# Patient Record
Sex: FEMALE | HISPANIC OR LATINO | Employment: FULL TIME | URBAN - METROPOLITAN AREA
[De-identification: names, ages, dates, MRNs, and addresses within clinical notes are randomized per-mention and may not be internally consistent; named-entity substitution may affect disease eponyms.]

---

## 2018-05-28 ENCOUNTER — HOSPITAL ENCOUNTER (EMERGENCY)
Facility: MEDICAL CENTER | Age: 48
End: 2018-05-28
Attending: EMERGENCY MEDICINE
Payer: COMMERCIAL

## 2018-05-28 ENCOUNTER — NON-PROVIDER VISIT (OUTPATIENT)
Dept: OCCUPATIONAL MEDICINE | Facility: CLINIC | Age: 48
End: 2018-05-28
Payer: COMMERCIAL

## 2018-05-28 VITALS
WEIGHT: 142.42 LBS | HEIGHT: 67 IN | HEART RATE: 69 BPM | BODY MASS INDEX: 22.35 KG/M2 | OXYGEN SATURATION: 99 % | DIASTOLIC BLOOD PRESSURE: 63 MMHG | RESPIRATION RATE: 16 BRPM | SYSTOLIC BLOOD PRESSURE: 102 MMHG | TEMPERATURE: 98.2 F

## 2018-05-28 DIAGNOSIS — Z02.83 ENCOUNTER FOR DRUG SCREENING: ICD-10-CM

## 2018-05-28 DIAGNOSIS — Z02.1 PRE-EMPLOYMENT DRUG SCREENING: ICD-10-CM

## 2018-05-28 DIAGNOSIS — M54.42 ACUTE BILATERAL LOW BACK PAIN WITH LEFT-SIDED SCIATICA: ICD-10-CM

## 2018-05-28 PROCEDURE — 80305 DRUG TEST PRSMV DIR OPT OBS: CPT | Performed by: INTERNAL MEDICINE

## 2018-05-28 PROCEDURE — 82075 ASSAY OF BREATH ETHANOL: CPT | Performed by: INTERNAL MEDICINE

## 2018-05-28 PROCEDURE — 8895 PB URINE 6 PANEL AFTER HOURS: Performed by: INTERNAL MEDICINE

## 2018-05-28 PROCEDURE — 99283 EMERGENCY DEPT VISIT LOW MDM: CPT

## 2018-05-28 RX ORDER — IBUPROFEN 800 MG/1
800 TABLET ORAL EVERY 8 HOURS PRN
Qty: 20 TAB | Refills: 0 | Status: SHIPPED | OUTPATIENT
Start: 2018-05-28

## 2018-05-28 RX ORDER — METHYLPREDNISOLONE 4 MG/1
TABLET ORAL
Qty: 1 KIT | Refills: 0 | Status: SHIPPED | OUTPATIENT
Start: 2018-05-28

## 2018-05-28 ASSESSMENT — PAIN SCALES - GENERAL: PAINLEVEL_OUTOF10: 5

## 2018-05-28 NOTE — ED NOTES
Pt bib self with c/o lower back pain that radiates down her legs. Pt states she's a  and while she was making a bed she developed back pain.

## 2018-05-28 NOTE — LETTER
"  FORM C-4:  EMPLOYEE’S CLAIM FOR COMPENSATION/ REPORT OF INITIAL TREATMENT  EMPLOYEE’S CLAIM - PROVIDE ALL INFORMATION REQUESTED   First Name  Reba Last Name  Dylan Birthdate             Age  1970 48 y.o. 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 (if applicable)  cleaning    How did this injury or occupational disease occur? Be specific and answer in detail. Use additional sheet if necessary)  I was in front of the bed, and I feel bad pain in my lower back I don't know exacly wath happen.   If you believe that you have an occupational disease, when did you first have knowledge of the disability and it relationship to your employment?  N/A Witnesses to the Accident  none     Nature of Injury or Occupational Disease  Strain  Part(s) of Body Injured or Affected  Lower Back Area (Lumbar Area & Lumbo-Sacral), N/A, N/A    I certify that the above is true and correct to the best of my knowledge and that I have provided this information in order to obtain " the benefits of [place]  I hereby authorize any physician, chiropractor, surgeon, practitioner, or other person, any hospital, including [facility] or [facility] hospital, any medical service organization, any insurance company, or other institution or organization to release to each other, any medical or other information, including benefits paid or payable, pertinent to this injury or disease, except information relative to diagnosis, treatment and/or counseling for AIDS, psychological conditions, alcohol or controlled substances, for which I must give specific authorization.  A Photostat of this authorization shall be as valid as the original.   Date  05/28/2018 [facility] [place]   Employee’s Signature   THIS REPORT MUST BE COMPLETED AND MAILED WITHIN 3 WORKING DAYS OF TREATMENT   Place  [facility], EMERGENCY DEPT  Name of Facility   [facility]   Date  5/28/2018 Diagnosis  (M54.42) Acute bilateral low back pain with left-sided sciatica Is there evidence the injured employee was under the influence of alcohol and/or another controlled substance at the time of accident?   Hour  2:40 PM Description of Injury or Disease  Acute bilateral low back pain with left-sided sciatica No   Treatment  1.  Medrol Dosepak  2.  Motrin 800 mg  Have you advised the patient to remain off work five days or more?         No   X-Ray Findings      If Yes   From Date    To Date      From information given by the employee, together with medical evidence, can you directly connect this injury or occupational disease as job incurred?  Yes If No, is the employee capable of: Full Duty  No Modified Duty  Yes   Is additional medical care by a physician indicated?  Yes If Modified Duty, Specify any Limitations / Restrictions  Light duty ×3 days     Do you know of any  "previous injury or disease contributing to this condition or occupational disease?  No   Date  5/28/2018 Print Doctor’s Name  Gansert, Guy G I certify the employer’s copy of this form was mailed on:   Address  [address] [place] [address] [phone] Insurer’s Use Only   [facility]  [address]    Provider’s Tax ID Number  [id number] Telephone  Dept: [phone]    Doctor’s Signature  e-SignGANSERT, GUY G M.D. Degree  M.D.    Original - TREATING PHYSICIAN OR CHIROPRACTOR   Pg 2-Insurer/TPA   Pg 3-Employer   Pg 4-Employee                                                                                                  Form C-4 (rev01/03)     BRIEF DESCRIPTION OF RIGHTS AND BENEFITS  (Pursuant to [place] 616C.050)    Notice of Injury or Occupational Disease (Incident Report Form C-1): If an injury or occupational disease (OD) arises out of and in the course of employment, you must provide written notice to your employer as soon as practicable, but no later than 7 days after the accident or OD. Your employer shall maintain a sufficient supply of the required forms.    Claim for Compensation (Form C-4): If medical treatment is sought, the form C-4 is available at the place of initial treatment. A completed \"Claim for Compensation\" (Form C-4) must be filed within 90 days after an accident or OD. The treating physician or chiropractor must, within 3 working days after treatment, complete and mail to the employer, the employer's insurer and third-party , the Claim for Compensation.    Medical Treatment: If you require medical treatment for your on-the-job injury or OD, you may be required to select a physician or chiropractor from a list provided by your workers’ compensation insurer, if it has contracted with an Organization for Managed Care (MCO) or Preferred Provider Organization (PPO) or providers of health care. If your employer has not entered into a contract with an MCO or PPO, " you may select a physician or chiropractor from the Panel of Physicians and Chiropractors. Any medical costs related to your industrial injury or OD will be paid by your insurer.    Temporary Total Disability (TTD): If your doctor has certified that you are unable to work for a period of at least 5 consecutive days, or 5 cumulative days in a 20-day period, or places restrictions on you that your employer does not accommodate, you may be entitled to TTD compensation.    Temporary Partial Disability (TPD): If the wage you receive upon reemployment is less than the compensation for TTD to which you are entitled, the insurer may be required to pay you TPD compensation to make up the difference. TPD can only be paid for a maximum of 24 months.    Permanent Partial Disability (PPD): When your medical condition is stable and there is an indication of a PPD as a result of your injury or OD, within 30 days, your insurer must arrange for an evaluation by a rating physician or chiropractor to determine the degree of your PPD. The amount of your PPD award depends on the date of injury, the results of the PPD evaluation and your age and wage.    Permanent Total Disability (PTD): If you are medically certified by a treating physician or chiropractor as permanently and totally disabled and have been granted a PTD status by your insurer, you are entitled to receive monthly benefits not to exceed 66 2/3% of your average monthly wage. The amount of your PTD payments is subject to reduction if you previously received a PPD award.    Vocational Rehabilitation Services: You may be eligible for vocational rehabilitation services if you are unable to return to the job due to a permanent physical impairment or permanent restrictions as a result of your injury or occupational disease.    Transportation and Per Amy Reimbursement: You may be eligible for travel expenses and per amy associated with medical treatment.  Reopening: You may be  able to reopen your claim if your condition worsens after claim closure.    Appeal Process: If you disagree with a written determination issued by the insurer or the insurer does not respond to your request, you may appeal to the Department of Administration, , by following the instructions contained in your determination letter. You must appeal the determination within 70 days from the date of the determination letter at [address], [address], [place], [place] [address], or [address], [place], [place] [address]. If you disagree with the  decision, you may appeal to the Department of Administration, . You must file your appeal within 30 days from the date of the  decision letter at [address], [address], [place], [place] [address], or [address], [place], [place] [address]. If you disagree with a decision of an , you may file a petition for judicial review with the District Court. You must do so within 30 days of the Appeal Officer’s decision. You may be represented by an  at your own expense or you may contact the [facility] for possible representation.    [place]  for Injured Workers (NAIW): If you disagree with a  decision, you may request that NAIW represent you without charge at an  Hearing. For information regarding denial of benefits, you may contact the [facility] at: [address], [address], [place] [address], [phone], or [address], [place] [address], [phone]    To File a Complaint with the Division: If you wish to file a complaint with the  of the Division of Industrial Relations (DIR), please contact the Workers’ Compensation Section, [address], [address], [place], [place] [address], telephone [phone], or [address], [address], [place]  [place] [address], telephone [phone].    For assistance with Workers’ Compensation Issues: you may contact the Office of the Governor Consumer Health Assistance, [address] [place] [address], [address], [place], [place] [address], Toll Free 1-833.156.8762, Web site: [email or web address]., E-mail [email or web address].                                                                                                                                                                                             ________________________________________________________________                                              05/28/2018            Employee Name / Signature                                                                                                                            Date                                       D-2 (rev. 10/07)

## 2018-05-28 NOTE — ED NOTES
Discharge instructions provided. Rx x2 given and pt educated on how and when to take medications, pt educated to follow up with works man comp for return to work. Pt verbalized the understanding of discharge instructions to follow up with PCP and to return to ER if condition worsens.  Pt ambulated out of ER without difficulty.

## 2018-05-28 NOTE — ED PROVIDER NOTES
"ED Provider Note    CHIEF COMPLAINT  Chief Complaint   Patient presents with   • Back Pain       HPI  Reba Richardson is a 48 y.o. female who presents for evaluation of back pain.  The patient was working today at the [employer], where she works as a .  The patient describes a twisting motion then states she develop lower back pain.  Chest some pain into her left lower extremity with some tingling in her left lateral leg area.  The patient denies any direct trauma or any recent falls.  She denies any previous back problems.  There's been no recent: Fever, chills, URI symptoms, cardiac respiratory symptoms, gastrointestinal symptoms, genitourinary symptoms.  She denies any possibility of pregnancy.  No other acute symptomatology or complaints.    REVIEW OF SYSTEMS  See HPI for further details.  Patient denies a history of: Hypertension, diabetes, thyroid dysfunction, seizures, cardiac pulmonary disorders, gastrointestinal disorders.  All other systems negative.    PAST MEDICAL HISTORY  History reviewed. No pertinent past medical history.    FAMILY HISTORY  History reviewed. No pertinent family history.    SOCIAL HISTORY  This occurred while working;    SURGICAL HISTORY  History reviewed. No pertinent surgical history.    CURRENT MEDICATIONS  None    ALLERGIES  Allergies not on file    PHYSICAL EXAM  VITAL SIGNS: /70   Pulse 68   Temp 36.8 °C (98.2 °F)   Resp 18   Ht 1.702 m (5' 7\")   Wt 64.6 kg (142 lb 6.7 oz)   SpO2 100%   BMI 22.31 kg/m²    Constitutional: A 48-year-old female, Well developed, Well nourished, is mildly uncomfortable with movement but oriented ×3  HENT: Normocephalic, Atraumatic, Nares:Clear, Oropharynx: moist, well hydrated, posterior pharynx:clear   Eyes: PERRL, EOMI, Conjunctiva normal, No discharge.   Neck: Normal range of motion, No tenderness, Supple, No stridor.   Lymphatic: No lymphadenopathy noted.   Cardiovascular: Regular rate and rhythm without mumurs, " gallups, rubs   Thorax & Lungs: Normal Equal breath sounds, No respiratory distress, No wheezing, no stridor, no rales. No chest tenderness.   Abdomen: Soft, nontender, nondistended, no organomegaly, positive bowel sounds normal in quality. No guarding or rebound.  Skin: Good skin turgor, pink, warm, dry. No rashes, petechiae, purpura. Normal capillary refill.   Back: Tenderness in the bilateral paraspinous musculature of the mid to lower lumbar spine region, No CVA tenderness.   Extremities: Intact distal pulses, No edema, No tenderness, No cyanosis,  Vascular: Pulses are 2+, symmetric in the upper and lower extremities.  Musculoskeletal: Good range of motion in all major joints. No tenderness to palpation or major deformities noted.   Neurologic: Alert & oriented x 3,  No gross focal deficits noted.  With 5/5; DTRs 2+ and symmetric; mildly positive straight leg raise at 30° on the left;   Psychiatric: Affect normal, Judgment normal, Mood normal.      COURSE & MEDICAL DECISION MAKING  Pertinent Labs & Imaging studies reviewed. (See chart for details)  Discussion: At this time, the patient has back pain with some radicular symptomatology.  There is no evidence of acute cord syndrome, discitis, arachnoiditis, meningitis or other infections.  There is no history of any significant trauma that would warrant imaging studies at this time.  Based on the findings, I do not feel we need to proceed with a laboratory or imaging studies.  The discussed the findings and treatment plan with the patient.  She indicates she is comfortable with this explanation and disposition.    FINAL IMPRESSION  1. Acute bilateral low back pain with left-sided sciatica        PLAN  1.  Appropriate discharge instructions given  2.  Medrol Dosepak  3.  Motrin 800 mg #20  4.  Follow-up [facility] [place] [facility] occupational [facility] tomorrow    Electronically signed by: Guy G Gansert, 5/28/2018 1:54 PM

## 2018-05-28 NOTE — DISCHARGE INSTRUCTIONS
Back Pain, Adult  Back pain is very common in adults. The cause of back pain is rarely dangerous and the pain often gets better over time. The cause of your back pain may not be known. Some common causes of back pain include:  · Strain of the muscles or ligaments supporting the spine.  · Wear and tear (degeneration) of the spinal disks.  · Arthritis.  · Direct injury to the back.  For many people, back pain may return. Since back pain is rarely dangerous, most people can learn to manage this condition on their own.  Follow these instructions at home:  Watch your back pain for any changes. The following actions may help to lessen any discomfort you are feeling:  · Remain active. It is stressful on your back to sit or  one place for long periods of time. Do not sit, drive, or  one place for more than 30 minutes at a time. Take short walks on even surfaces as soon as you are able. Try to increase the length of time you walk each day.  · Exercise regularly as directed by your health care provider. Exercise helps your back heal faster. It also helps avoid future injury by keeping your muscles strong and flexible.  · Do not stay in bed. Resting more than 1-2 days can delay your recovery.  · Pay attention to your body when you bend and lift. The most comfortable positions are those that put less stress on your recovering back. Always use proper lifting techniques, including:  ¨ Bending your knees.  ¨ Keeping the load close to your body.  ¨ Avoiding twisting.  · Find a comfortable position to sleep. Use a firm mattress and lie on your side with your knees slightly bent. If you lie on your back, put a pillow under your knees.  · Avoid feeling anxious or stressed. Stress increases muscle tension and can worsen back pain. It is important to recognize when you are anxious or stressed and learn ways to manage it, such as with exercise.  · Take medicines only as directed by your health care provider.  Over-the-counter medicines to reduce pain and inflammation are often the most helpful. Your health care provider may prescribe muscle relaxant drugs. These medicines help dull your pain so you can more quickly return to your normal activities and healthy exercise.  · Apply ice to the injured area:  ¨ Put ice in a plastic bag.  ¨ Place a towel between your skin and the bag.  ¨ Leave the ice on for 20 minutes, 2-3 times a day for the first 2-3 days. After that, ice and heat may be alternated to reduce pain and spasms.  · Maintain a healthy weight. Excess weight puts extra stress on your back and makes it difficult to maintain good posture.  Contact a health care provider if:  · You have pain that is not relieved with rest or medicine.  · You have increasing pain going down into the legs or buttocks.  · You have pain that does not improve in one week.  · You have night pain.  · You lose weight.  · You have a fever or chills.  Get help right away if:  · You develop new bowel or bladder control problems.  · You have unusual weakness or numbness in your arms or legs.  · You develop nausea or vomiting.  · You develop abdominal pain.  · You feel faint.  This information is not intended to replace advice given to you by your health care provider. Make sure you discuss any questions you have with your health care provider.  Document Released: 12/18/2006 Document Revised: 04/27/2017 Document Reviewed: 04/21/2015  [employer] Interactive Patient Education © 2017 [employer]

## 2018-05-29 LAB
AMP AMPHETAMINE: NORMAL
BREATH ALCOHOL COMMENT: NORMAL
COC COCAINE: NORMAL
INT CON NEG: NEGATIVE
INT CON POS: POSITIVE
MET METHAMPHETAMINES: NORMAL
OPI OPIATES: NORMAL
PCP PHENCYCLIDINE: NORMAL
POC BREATHALIZER: 0 PERCENT (ref 0–0.01)
POC DRUG COMMENT 753798-OCCUPATIONAL HEALTH: NORMAL
THC: NORMAL

## 2018-07-11 ENCOUNTER — TELEPHONE (OUTPATIENT)
Dept: VASCULAR LAB | Facility: MEDICAL CENTER | Age: 48
End: 2018-07-11

## 2018-07-11 NOTE — TELEPHONE ENCOUNTER
Called patient to schedule initial vasscular appointment with Dr. Cheng. LM instructing patient to call back when available to schedule.    Robert Cardenas, Med. [facility]

## 2018-12-19 ENCOUNTER — TELEPHONE (OUTPATIENT)
Dept: VASCULAR LAB | Facility: MEDICAL CENTER | Age: 48
End: 2018-12-19

## 2018-12-20 NOTE — TELEPHONE ENCOUNTER
Patient no-showed for initial vascular medicine referral and did not r/s.  Pending further patient contact, will defer all further vascular care to pcp.    Michael Bloch, MD  Vascular Care    Cc: ANDRES Whitman

## 2024-08-23 ENCOUNTER — HOSPITAL ENCOUNTER (OUTPATIENT)
Dept: LAB | Facility: MEDICAL CENTER | Age: 54
End: 2024-08-23
Attending: STUDENT IN AN ORGANIZED HEALTH CARE EDUCATION/TRAINING PROGRAM
Payer: COMMERCIAL

## 2024-08-23 LAB
25(OH)D3 SERPL-MCNC: 27 NG/ML (ref 30–100)
ALBUMIN SERPL BCP-MCNC: 4.5 G/DL (ref 3.2–4.9)
ALBUMIN/GLOB SERPL: 1.7 G/DL
ALP SERPL-CCNC: 80 U/L (ref 30–99)
ALT SERPL-CCNC: 20 U/L (ref 2–50)
ANION GAP SERPL CALC-SCNC: 10 MMOL/L (ref 7–16)
AST SERPL-CCNC: 23 U/L (ref 12–45)
BASOPHILS # BLD AUTO: 1.1 % (ref 0–1.8)
BASOPHILS # BLD: 0.04 K/UL (ref 0–0.12)
BILIRUB SERPL-MCNC: 0.6 MG/DL (ref 0.1–1.5)
BUN SERPL-MCNC: 13 MG/DL (ref 8–22)
CALCIUM ALBUM COR SERPL-MCNC: 9.3 MG/DL (ref 8.5–10.5)
CALCIUM SERPL-MCNC: 9.7 MG/DL (ref 8.5–10.5)
CHLORIDE SERPL-SCNC: 104 MMOL/L (ref 96–112)
CHOLEST SERPL-MCNC: 240 MG/DL (ref 100–199)
CO2 SERPL-SCNC: 25 MMOL/L (ref 20–33)
CREAT SERPL-MCNC: 0.68 MG/DL (ref 0.5–1.4)
EOSINOPHIL # BLD AUTO: 0.11 K/UL (ref 0–0.51)
EOSINOPHIL NFR BLD: 2.9 % (ref 0–6.9)
ERYTHROCYTE [DISTWIDTH] IN BLOOD BY AUTOMATED COUNT: 41.2 FL (ref 35.9–50)
EST. AVERAGE GLUCOSE BLD GHB EST-MCNC: 97 MG/DL
FASTING STATUS PATIENT QL REPORTED: NORMAL
GFR SERPLBLD CREATININE-BSD FMLA CKD-EPI: 103 ML/MIN/1.73 M 2
GLOBULIN SER CALC-MCNC: 2.7 G/DL (ref 1.9–3.5)
GLUCOSE SERPL-MCNC: 99 MG/DL (ref 65–99)
HBA1C MFR BLD: 5 % (ref 4–5.6)
HCT VFR BLD AUTO: 43.4 % (ref 37–47)
HDLC SERPL-MCNC: 71 MG/DL
HGB BLD-MCNC: 14.4 G/DL (ref 12–16)
IMM GRANULOCYTES # BLD AUTO: 0 K/UL (ref 0–0.11)
IMM GRANULOCYTES NFR BLD AUTO: 0 % (ref 0–0.9)
LDLC SERPL CALC-MCNC: 158 MG/DL
LYMPHOCYTES # BLD AUTO: 1.59 K/UL (ref 1–4.8)
LYMPHOCYTES NFR BLD: 42 % (ref 22–41)
MCH RBC QN AUTO: 32.3 PG (ref 27–33)
MCHC RBC AUTO-ENTMCNC: 33.2 G/DL (ref 32.2–35.5)
MCV RBC AUTO: 97.3 FL (ref 81.4–97.8)
MONOCYTES # BLD AUTO: 0.28 K/UL (ref 0–0.85)
MONOCYTES NFR BLD AUTO: 7.4 % (ref 0–13.4)
NEUTROPHILS # BLD AUTO: 1.77 K/UL (ref 1.82–7.42)
NEUTROPHILS NFR BLD: 46.6 % (ref 44–72)
NRBC # BLD AUTO: 0 K/UL
NRBC BLD-RTO: 0 /100 WBC (ref 0–0.2)
PLATELET # BLD AUTO: 209 K/UL (ref 164–446)
PMV BLD AUTO: 9.9 FL (ref 9–12.9)
POTASSIUM SERPL-SCNC: 4.5 MMOL/L (ref 3.6–5.5)
PROT SERPL-MCNC: 7.2 G/DL (ref 6–8.2)
RBC # BLD AUTO: 4.46 M/UL (ref 4.2–5.4)
SODIUM SERPL-SCNC: 139 MMOL/L (ref 135–145)
TRIGL SERPL-MCNC: 57 MG/DL (ref 0–149)
TSH SERPL DL<=0.005 MIU/L-ACNC: 0.68 UIU/ML (ref 0.38–5.33)
WBC # BLD AUTO: 3.8 K/UL (ref 4.8–10.8)

## 2024-08-23 PROCEDURE — 80053 COMPREHEN METABOLIC PANEL: CPT

## 2024-08-23 PROCEDURE — 83036 HEMOGLOBIN GLYCOSYLATED A1C: CPT

## 2024-08-23 PROCEDURE — 80061 LIPID PANEL: CPT

## 2024-08-23 PROCEDURE — 85025 COMPLETE CBC W/AUTO DIFF WBC: CPT

## 2024-08-23 PROCEDURE — 36415 COLL VENOUS BLD VENIPUNCTURE: CPT

## 2024-08-23 PROCEDURE — 82306 VITAMIN D 25 HYDROXY: CPT

## 2024-08-23 PROCEDURE — 84443 ASSAY THYROID STIM HORMONE: CPT

## 2024-09-14 ENCOUNTER — APPOINTMENT (OUTPATIENT)
Dept: RADIOLOGY | Facility: MEDICAL CENTER | Age: 54
End: 2024-09-14
Attending: EMERGENCY MEDICINE
Payer: COMMERCIAL

## 2024-09-14 ENCOUNTER — HOSPITAL ENCOUNTER (EMERGENCY)
Facility: MEDICAL CENTER | Age: 54
End: 2024-09-14
Attending: EMERGENCY MEDICINE
Payer: COMMERCIAL

## 2024-09-14 VITALS
BODY MASS INDEX: 23.38 KG/M2 | TEMPERATURE: 96.8 F | SYSTOLIC BLOOD PRESSURE: 122 MMHG | OXYGEN SATURATION: 97 % | HEART RATE: 58 BPM | HEIGHT: 66 IN | DIASTOLIC BLOOD PRESSURE: 72 MMHG | WEIGHT: 145.5 LBS | RESPIRATION RATE: 18 BRPM

## 2024-09-14 DIAGNOSIS — R07.89 CHEST WALL PAIN: ICD-10-CM

## 2024-09-14 LAB
ALBUMIN SERPL BCP-MCNC: 4.7 G/DL (ref 3.2–4.9)
ALBUMIN/GLOB SERPL: 1.4 G/DL
ALP SERPL-CCNC: 82 U/L (ref 30–99)
ALT SERPL-CCNC: 16 U/L (ref 2–50)
ANION GAP SERPL CALC-SCNC: 16 MMOL/L (ref 7–16)
AST SERPL-CCNC: 22 U/L (ref 12–45)
BASOPHILS # BLD AUTO: 0.6 % (ref 0–1.8)
BASOPHILS # BLD: 0.03 K/UL (ref 0–0.12)
BILIRUB SERPL-MCNC: 0.3 MG/DL (ref 0.1–1.5)
BUN SERPL-MCNC: 20 MG/DL (ref 8–22)
CALCIUM ALBUM COR SERPL-MCNC: 9.1 MG/DL (ref 8.5–10.5)
CALCIUM SERPL-MCNC: 9.7 MG/DL (ref 8.5–10.5)
CHLORIDE SERPL-SCNC: 106 MMOL/L (ref 96–112)
CO2 SERPL-SCNC: 19 MMOL/L (ref 20–33)
CREAT SERPL-MCNC: 0.53 MG/DL (ref 0.5–1.4)
D DIMER PPP IA.FEU-MCNC: 0.57 UG/ML (FEU) (ref 0–0.5)
EKG IMPRESSION: NORMAL
EOSINOPHIL # BLD AUTO: 0.16 K/UL (ref 0–0.51)
EOSINOPHIL NFR BLD: 3.3 % (ref 0–6.9)
ERYTHROCYTE [DISTWIDTH] IN BLOOD BY AUTOMATED COUNT: 41.1 FL (ref 35.9–50)
GFR SERPLBLD CREATININE-BSD FMLA CKD-EPI: 110 ML/MIN/1.73 M 2
GLOBULIN SER CALC-MCNC: 3.3 G/DL (ref 1.9–3.5)
GLUCOSE SERPL-MCNC: 101 MG/DL (ref 65–99)
HCT VFR BLD AUTO: 45.2 % (ref 37–47)
HGB BLD-MCNC: 15.1 G/DL (ref 12–16)
IMM GRANULOCYTES # BLD AUTO: 0.01 K/UL (ref 0–0.11)
IMM GRANULOCYTES NFR BLD AUTO: 0.2 % (ref 0–0.9)
LIPASE SERPL-CCNC: 32 U/L (ref 11–82)
LYMPHOCYTES # BLD AUTO: 1.55 K/UL (ref 1–4.8)
LYMPHOCYTES NFR BLD: 31.7 % (ref 22–41)
MCH RBC QN AUTO: 32.3 PG (ref 27–33)
MCHC RBC AUTO-ENTMCNC: 33.4 G/DL (ref 32.2–35.5)
MCV RBC AUTO: 96.6 FL (ref 81.4–97.8)
MONOCYTES # BLD AUTO: 0.38 K/UL (ref 0–0.85)
MONOCYTES NFR BLD AUTO: 7.8 % (ref 0–13.4)
NEUTROPHILS # BLD AUTO: 2.76 K/UL (ref 1.82–7.42)
NEUTROPHILS NFR BLD: 56.4 % (ref 44–72)
NRBC # BLD AUTO: 0 K/UL
NRBC BLD-RTO: 0 /100 WBC (ref 0–0.2)
PLATELET # BLD AUTO: 204 K/UL (ref 164–446)
PMV BLD AUTO: 9.8 FL (ref 9–12.9)
POTASSIUM SERPL-SCNC: 4.2 MMOL/L (ref 3.6–5.5)
PROT SERPL-MCNC: 8 G/DL (ref 6–8.2)
RBC # BLD AUTO: 4.68 M/UL (ref 4.2–5.4)
SODIUM SERPL-SCNC: 141 MMOL/L (ref 135–145)
TROPONIN T SERPL-MCNC: <6 NG/L (ref 6–19)
WBC # BLD AUTO: 4.9 K/UL (ref 4.8–10.8)

## 2024-09-14 PROCEDURE — 84484 ASSAY OF TROPONIN QUANT: CPT

## 2024-09-14 PROCEDURE — 85025 COMPLETE CBC W/AUTO DIFF WBC: CPT

## 2024-09-14 PROCEDURE — 83690 ASSAY OF LIPASE: CPT

## 2024-09-14 PROCEDURE — 99283 EMERGENCY DEPT VISIT LOW MDM: CPT

## 2024-09-14 PROCEDURE — 80053 COMPREHEN METABOLIC PANEL: CPT

## 2024-09-14 PROCEDURE — 93005 ELECTROCARDIOGRAM TRACING: CPT | Performed by: EMERGENCY MEDICINE

## 2024-09-14 PROCEDURE — 71045 X-RAY EXAM CHEST 1 VIEW: CPT

## 2024-09-14 PROCEDURE — 36415 COLL VENOUS BLD VENIPUNCTURE: CPT

## 2024-09-14 PROCEDURE — 700101 HCHG RX REV CODE 250: Performed by: EMERGENCY MEDICINE

## 2024-09-14 PROCEDURE — 85379 FIBRIN DEGRADATION QUANT: CPT

## 2024-09-14 RX ORDER — LIDOCAINE 4 G/G
1 PATCH TOPICAL EVERY 24 HOURS
Status: DISCONTINUED | OUTPATIENT
Start: 2024-09-14 | End: 2024-09-14 | Stop reason: HOSPADM

## 2024-09-14 RX ADMIN — LIDOCAINE 1 PATCH: 4 PATCH TOPICAL at 03:43

## 2024-09-14 ASSESSMENT — FIBROSIS 4 INDEX: FIB4 SCORE: 1.33

## 2024-09-14 NOTE — ED TRIAGE NOTES
Chief Complaint   Patient presents with    Rib Pain     Pt complaining of left rib pain that has been going on for a few days. Pt denies any trauma to that side of her body. Pt currently complaining of 10/10 pain.     Vitals:    09/14/24 0014   BP: 130/86   Pulse: 62   Resp: 18   Temp: 36 °C (96.8 °F)   SpO2: 98%

## 2024-09-14 NOTE — ED NOTES
Patient given discharge instructions, home care instructions explained, patient verbalized understanding of instructions given/patient understands importance of follow up, patient ambulatory to ER [facility].

## 2024-09-14 NOTE — ED PROVIDER NOTES
ED Provider Note    CHIEF COMPLAINT  Chief Complaint   Patient presents with    Rib Pain     Pt complaining of left rib pain that has been going on for a few days. Pt denies any trauma to that side of her body. Pt currently complaining of 10/10 pain.       EXTERNAL RECORDS REVIEWED  Outpatient Notes outpatient office visit 8/16/2024 at which point she was followed for obesity, chronic low back pain, vitamin D deficiency, malignant neoplasm of breast, hyperlipidemia    HPI/ROS  LIMITATION TO HISTORY   Select: Language Maltese,  Used   OUTSIDE HISTORIAN(S):  Family at bedside for history and translation    Reba Richardson is a 54 y.o. female who presents to the Emergency Department for left-sided chest discomfort.  Works as a local .  Denies any new trauma or injury.  No recent travel.  No leg pain or swelling.  No history of DVT or PE.  No known cardiac or pulmonary pathologies.  Does not take any prescription medications and does not have a local primary care physician.  Does not smoke but socially drinks alcohol.  No illicit substances.    Pain is worse with movement and also deep inspiration.  Has been using Tylenol intermittently over the last couple days which does help the pain.    PAST MEDICAL HISTORY       SURGICAL HISTORY  patient denies any surgical history    FAMILY HISTORY  History reviewed. No pertinent family history.    SOCIAL HISTORY  Social History     Tobacco Use    Smoking status: Never    Smokeless tobacco: Never   Substance and Sexual Activity    Alcohol use: No    Drug use: No    Sexual activity: Not on file       CURRENT MEDICATIONS  Home Medications       Reviewed by Viet Perales R.N. (Registered Nurse) on 09/14/24 at 0022  Med List Status: Not Addressed     Medication Last Dose Status   ibuprofen (MOTRIN) 800 MG Tab  Active   MethylPREDNISolone (MEDROL DOSEPAK) 4 MG Tablet Therapy Pack  Active                  Audit from Redirected Encounters    **Home  "medications have not yet been reviewed for this encounter**         ALLERGIES  No Known Allergies    PHYSICAL EXAM  VITAL SIGNS: /72   Pulse (!) 58   Temp 36 °C (96.8 °F) (Temporal)   Resp 18   Ht 1.676 m (5' 6\")   Wt 66 kg (145 lb 8.1 oz)   SpO2 97%   BMI 23.48 kg/m²      Pulse ox interpretation: I interpret this pulse ox as normal.  Constitutional: Alert in no apparent distress.  HENT: No signs of trauma, Bilateral external ears normal, Nose normal.   Eyes: Pupils are equal and reactive  Neck: Normal range of motion, No tenderness, Supple  Cardiovascular: Regular rate and rhythm, no murmurs.   Thorax & Lungs: Normal breath sounds, No respiratory distress, No wheezing, No chest tenderness.   Abdomen: Bowel sounds normal, Soft, No tenderness  Skin: Warm, Dry, No erythema, No rash.   Musculoskeletal: Good range of motion in all major joints. No tenderness to palpation or major deformities noted.   Neurologic: Alert , Normal motor function, Normal sensory function, No focal deficits noted.   Psychiatric: Affect normal, Judgment normal, Mood normal.         EKG/LABS  Results for orders placed or performed during the hospital encounter of 09/14/24   CBC WITH DIFFERENTIAL   Result Value Ref Range    WBC 4.9 4.8 - 10.8 K/uL    RBC 4.68 4.20 - 5.40 M/uL    Hemoglobin 15.1 12.0 - 16.0 g/dL    Hematocrit 45.2 37.0 - 47.0 %    MCV 96.6 81.4 - 97.8 fL    MCH 32.3 27.0 - 33.0 pg    MCHC 33.4 32.2 - 35.5 g/dL    RDW 41.1 35.9 - 50.0 fL    Platelet Count 204 164 - 446 K/uL    MPV 9.8 9.0 - 12.9 fL    Neutrophils-Polys 56.40 44.00 - 72.00 %    Lymphocytes 31.70 22.00 - 41.00 %    Monocytes 7.80 0.00 - 13.40 %    Eosinophils 3.30 0.00 - 6.90 %    Basophils 0.60 0.00 - 1.80 %    Immature Granulocytes 0.20 0.00 - 0.90 %    Nucleated RBC 0.00 0.00 - 0.20 /100 WBC    Neutrophils (Absolute) 2.76 1.82 - 7.42 K/uL    Lymphs (Absolute) 1.55 1.00 - 4.80 K/uL    Monos (Absolute) 0.38 0.00 - 0.85 K/uL    Eos (Absolute) 0.16 0.00 " - 0.51 K/uL    Baso (Absolute) 0.03 0.00 - 0.12 K/uL    Immature Granulocytes (abs) 0.01 0.00 - 0.11 K/uL    NRBC (Absolute) 0.00 K/uL   COMP METABOLIC PANEL   Result Value Ref Range    Sodium 141 135 - 145 mmol/L    Potassium 4.2 3.6 - 5.5 mmol/L    Chloride 106 96 - 112 mmol/L    Co2 19 (L) 20 - 33 mmol/L    Anion Gap 16.0 7.0 - 16.0    Glucose 101 (H) 65 - 99 mg/dL    Bun 20 8 - 22 mg/dL    Creatinine 0.53 0.50 - 1.40 mg/dL    Calcium 9.7 8.5 - 10.5 mg/dL    Correct Calcium 9.1 8.5 - 10.5 mg/dL    AST(SGOT) 22 12 - 45 U/L    ALT(SGPT) 16 2 - 50 U/L    Alkaline Phosphatase 82 30 - 99 U/L    Total Bilirubin 0.3 0.1 - 1.5 mg/dL    Albumin 4.7 3.2 - 4.9 g/dL    Total Protein 8.0 6.0 - 8.2 g/dL    Globulin 3.3 1.9 - 3.5 g/dL    A-G Ratio 1.4 g/dL   LIPASE   Result Value Ref Range    Lipase 32 11 - 82 U/L   TROPONIN   Result Value Ref Range    Troponin T <6 6 - 19 ng/L   D-DIMER   Result Value Ref Range    D-Dimer 0.57 (H) 0.00 - 0.50 ug/mL (FEU)   ESTIMATED GFR   Result Value Ref Range    GFR (CKD-EPI) 110 >60 mL/min/1.73 m 2   EKG   Result Value Ref Range    Report       [facility] Emergency Dept.    Test Date:  2024  Pt Name:    CHALINO RAJIV  Department: ER  MRN:        [id number]                      Room:       [facility]  Gender:     Female                       Technician: [id number]  :        1970                   Requested By:MARLON TELLEZ  Order #:    [id number]                    Reading MD: Marlon Tellez    Measurements  Intervals                                Axis  Rate:       56                           P:          47  MN:         130                          QRS:        51  QRSD:       113                          T:          17  QT:         437  QTc:        422    Interpretive Statements  Sinus bradycardia  Borderline intraventricular conduction delay  No previous ECG available for comparison  Electronically Signed On 2024 03:15:22 PDT by Marlon Tellez          I have independently interpreted this EKG    RADIOLOGY/PROCEDURES   I have independently interpreted the diagnostic imaging associated with this visit and am waiting the final reading from the radiologist.   My preliminary interpretation is as follows: No large consolidative process or pneumothorax    Radiologist interpretation:  DX-CHEST-PORTABLE (1 VIEW)   Final Result      No acute cardiac or pulmonary abnormalities are identified.          COURSE & MEDICAL DECISION MAKING    ASSESSMENT, COURSE AND PLAN  Care Narrative: 54-year-old female presenting to the emerged from with above present Tatian.  Will complete from chest pain differential standpoint.      DISPOSITION AND DISCUSSIONS  I have discussed management of the patient with the following physicians and VIOLETTE's: None    Discussion of management with other QHP or appropriate source(s): None     Escalation of care considered, and ultimately not performed:acute inpatient care management, however at this time, the patient is most appropriate for outpatient management    Barriers to care at this time, including but not limited to:  None .     54-year-old presenting with left-sided chest pain.  Workup as above.  Troponin negative.  D-dimer age-adjusted negative.  Chest x-ray negative.  Patient appears overall well.  Will provide Lidoderm patch for pain control and have her continue with anti-inflammatories over-the-counter.  I will have her referred back to her PCP as well as to cardiology for outpatient care and follow-up.      Heart score is low.    FINAL DIAGNOSIS  1. Chest wall pain         Electronically signed by: Marlon Cabello M.D., 9/14/2024 1:16 AM

## 2024-09-14 NOTE — ED NOTES
Patient's heart rate dropped to the 30s when drawing blood, ERP notified, no new orders at this time

## 2024-09-23 ENCOUNTER — APPOINTMENT (OUTPATIENT)
Dept: RADIOLOGY | Facility: MEDICAL CENTER | Age: 54
End: 2024-09-23
Attending: STUDENT IN AN ORGANIZED HEALTH CARE EDUCATION/TRAINING PROGRAM
Payer: COMMERCIAL

## 2024-09-23 DIAGNOSIS — Z12.31 VISIT FOR SCREENING MAMMOGRAM: ICD-10-CM

## 2024-09-23 DIAGNOSIS — M53.3 DISORDER OF SACRUM: ICD-10-CM

## 2024-09-23 DIAGNOSIS — M54.50 LOW BACK PAIN, UNSPECIFIED BACK PAIN LATERALITY, UNSPECIFIED CHRONICITY, UNSPECIFIED WHETHER SCIATICA PRESENT: ICD-10-CM

## 2024-09-23 PROCEDURE — 77067 SCR MAMMO BI INCL CAD: CPT

## 2024-09-23 PROCEDURE — 73521 X-RAY EXAM HIPS BI 2 VIEWS: CPT

## 2024-09-23 PROCEDURE — 72110 X-RAY EXAM L-2 SPINE 4/>VWS: CPT

## 2024-10-02 ENCOUNTER — APPOINTMENT (OUTPATIENT)
Dept: CARDIOLOGY | Facility: MEDICAL CENTER | Age: 54
End: 2024-10-02
Attending: EMERGENCY MEDICINE
Payer: COMMERCIAL

## 2024-10-07 ENCOUNTER — TELEPHONE (OUTPATIENT)
Dept: HEALTH INFORMATION MANAGEMENT | Facility: OTHER | Age: 54
End: 2024-10-07
Payer: COMMERCIAL

## 2025-04-17 ENCOUNTER — PHYSICAL THERAPY (OUTPATIENT)
Dept: PHYSICAL THERAPY | Facility: REHABILITATION | Age: 55
End: 2025-04-17
Attending: STUDENT IN AN ORGANIZED HEALTH CARE EDUCATION/TRAINING PROGRAM
Payer: COMMERCIAL

## 2025-04-17 DIAGNOSIS — M54.50 LOW BACK PAIN, UNSPECIFIED BACK PAIN LATERALITY, UNSPECIFIED CHRONICITY, UNSPECIFIED WHETHER SCIATICA PRESENT: ICD-10-CM

## 2025-04-17 PROCEDURE — 97161 PT EVAL LOW COMPLEX 20 MIN: CPT

## 2025-04-17 PROCEDURE — 97110 THERAPEUTIC EXERCISES: CPT

## 2025-04-17 ASSESSMENT — ENCOUNTER SYMPTOMS
PAIN SCALE AT LOWEST: 0
PAIN SCALE: 0
PAIN SCALE AT HIGHEST: 10

## 2025-04-17 NOTE — OP THERAPY EVALUATION
Outpatient Physical Therapy  INITIAL EVALUATION    [facility] [place]  [address]  [place] [address]  Phone:  [phone]  Fax:  [phone]    Date of Evaluation: 2025    Patient: Reba Richardson  YOB: 1970  MRN: [id number]     Referring Provider: Emir Ruiz P.A.-C.  [address] [address]   Referring Diagnosis Low back pain, unspecified [M54.50]     Time Calculation  Start time: 1545  Stop time: 1630 Time Calculation (min): 45 minutes         Chief Complaint: back problem    Visit Diagnoses     ICD-10-CM   1. Low back pain, unspecified back pain laterality, unspecified chronicity, unspecified whether sciatica present  M54.50       Subjective:   History of Present Illness:     Mechanism of injury:  Reba Richardson is a 54 y.o. female that presents to therapy with low to mid back pain. Her symptoms came on with insidious onset. Her pain is located along her mid to low back without descending down either leg. Patient denies fevers/chills, numbness/weakness of lower extremities, bowel/bladder incontinence, saddle anesthesia.         Aggravating factors: working, lifting possibly, stress  Relieving factors:heat, rest, aleve, rubbing back on her     ADL limitations:frequent pain associated with working/ stress limiting function outside of work.   Pain:     Current pain ratin    At best pain ratin    At worst pain rating:  10      No past medical history on file.  No past surgical history on file.  Social History     Tobacco Use    Smoking status: Never    Smokeless tobacco: Never   Substance Use Topics    Alcohol use: No     Family and Occupational History     Socioeconomic History    Marital status:      Spouse name: Not on file    Number of children: Not on file    Years of education: Not on file    Highest education level: Not on file   Occupational History    Not on file       Objective  Hip  Screen   Hip range of motion:WFL  Hip strength: WFL    Neurological Testing     Reflexes   Ankle clonus reflex: negative  Babinski sign: DNT    Right   Ankle clonus reflex: negative  Babinski sign: DNT    Myotome testing   Lumbar (left)     L2 (hip flexors): 5/5  L3 (knee extensors):5/5  L4 (ankle dorsiflexors): 5/5  L5 (great toe extension): 5/5  S1 (ankle plantar flexors): 5/5    Lumbar (right)     L2 (hip flexors): 5/5  L3 (knee extensors):5/5  L4 (ankle dorsiflexors): 5/5  L5 (great toe extension): 5/5  S1 (ankle plantar flexors): 5/5    Dermatome testing   INTACT Bilaterally    Palpation   No tenderness to light palpation     Active Range of Motion Spinal:  Flexion: WNL  Extension: WNL  Left lateral flexion: WNL  Right lateral flexion: WNL  Left rotation: 25% reduced   Right rotation: WNL    Strength:      Abdominals   Lower abdominals: able to maintain neutral statically 4/5    Back   Flexion: 4+/5  Extension 4+/5 onset of pain/ discomfort     Hip, knee and ankle strength documented above in neuro screen    Tests     Lumbar spine     Slump:negative   Thigh thrust:negative  Sacral rotation:negative  Gaenslen's: negative  SI compression: negative  SI Distraction:negative   SLR: negative  Quadrant: negative    General Comments     Gait   normal      Exercises/Treatment  Access Code: [email or web address]  URL: [email or web address]  Date: 04/17/2025  Prepared by: Himanshu Shay    Exercises  - Cat Cow to Child's Pose  - 2 x daily - 20 reps - 1-5 hold  - Quadruped Alternating Leg Extensions  - 1 x daily - 2 sets - 10 reps  - Supine March with Posterior Pelvic Tilt  - 1 x daily - 2 sets - 10 reps    Time-based treatments/modalities:    Physical Therapy Timed Treatment Charges  Therapeutic exercise minutes (CPT 74072): 15 minutes      Assessment, Response and Plan:   Assessment details:  Reba Dylan is a 54 y.o. female with signs and symptoms consistent with recurrent lumbar paraspinal strains vs general  non-specific low back pain. She requires skilled physical therapy intervention to decrease pain, increase range of motion, increase functional mobility, improve ADL completion and establish a home exercise program.  Goals:   Short Term Goals:   1. Patient will be Independent with prescribed Home Exercise Program (HEP) and will be able to demonstrate exercises without cues for improved overall symptoms/activity tolerance.   2. Pt will improve rotation ROM to WNL for improved ability to twist and turn while working.  Short term goal time span:  2-4 weeks      Long Term Goals:    3. Pt will improve ability to reduce pain s/p work by 2 NPRS levels within 5 minutes of initiation.   4. Pt will improve LBDI score to less than 15% indicative of improved function and reduced perceived disability.  5, Pt to demonstrate sound lifting mechanics without pain.  Long term goal time span:  4-6 weeks    Plan:   Planned therapy interventions:  Therapeutic Exercise (CPT 55622), Manual Therapy (CPT 52607), Neuromuscular Re-education (CPT 11472) and E Stim Unattended (CPT 61164)  Frequency: 1-2x/week.  Duration in weeks:  6  Discussed with:  Patient    Functional Assessment Used  PT Functional Assessment Tool Used: LBDQ/ OMEGA  PT Functional Assessment Score: 22%     Referring provider co-signature:  I have reviewed this plan of care and my co-signature certifies the need for services.    Certification Period: 04/17/2025 to  05/29/25    Physician Signature: ________________________________ Date: ______________

## 2025-04-24 ENCOUNTER — PHYSICAL THERAPY (OUTPATIENT)
Dept: PHYSICAL THERAPY | Facility: REHABILITATION | Age: 55
End: 2025-04-24
Attending: STUDENT IN AN ORGANIZED HEALTH CARE EDUCATION/TRAINING PROGRAM
Payer: COMMERCIAL

## 2025-04-24 DIAGNOSIS — M54.50 LOW BACK PAIN, UNSPECIFIED BACK PAIN LATERALITY, UNSPECIFIED CHRONICITY, UNSPECIFIED WHETHER SCIATICA PRESENT: ICD-10-CM

## 2025-04-24 PROCEDURE — 97014 ELECTRIC STIMULATION THERAPY: CPT

## 2025-04-24 PROCEDURE — 97110 THERAPEUTIC EXERCISES: CPT

## 2025-04-24 NOTE — OP THERAPY DAILY TREATMENT
Outpatient Physical Therapy  DAILY TREATMENT     [facility] [place]  [address]  [facility] [facility] [address]  Phone:  [phone]  Fax:  [phone]    Date: 04/24/2025    Patient: Reba Richardson  YOB: 1970  MRN: [id number]     Time Calculation    Start time: 1545  Stop time: 1630 Time Calculation (min): 45 minutes         Chief Complaint: back problem    Visit #: 2    SUBJECTIVE:  Reports compliance with HEP and notes no pain with the exercises. Continues to note pain s/p work and has not had a day off in over a week.     OBJECTIVE:  Current objective measures:           Therapeutic Exercises (CPT 75605):     1. supine lumbar twist, x 1min    2. Supine ball roll, abd bracing 1min x 2    3. Supine march, lvl 1 x 20    4. Supine march, lvl 2 x 20    5. ball bridge, x 25    6. Seated pelvic tilt, a/p x 30, lateral x 40    7. open book, x 40      Therapeutic Exercise Summary: HEP instruction/performance and development. Handout provided and exercises located below:  Access Code: [email or web address]  URL: [email or web address]  Date: 04/24/2025  Prepared by: Himanshu Shay    Exercises  - Cat Cow to Child's Pose  - 2 x daily - 20 reps - 1-5 hold  - Quadruped Alternating Leg Extensions  - 1 x daily - 2 sets - 10 reps  - Supine March with Posterior Pelvic Tilt  - 1 x daily - 2 sets - 10 reps    Therapeutic Treatments and Modalities:     1. E Stim Unattended (CPT 19386), IFC and MHX to lumbar spine x 15min    Time-based treatments/modalities:    Physical Therapy Timed Treatment Charges  Therapeutic exercise minutes (CPT 71718): 30 minutes  Pain rating (1-10) before treatment:  1  Pain rating (1-10) after treatment:  1    ASSESSMENT:   Response to treatment: symptoms controlled and suspect likely related to overuse based on presentation. HEP to be maintained. F/u next week.     PLAN/RECOMMENDATIONS:   Plan for treatment: therapy treatment to continue next visit.  Planned  interventions for next visit: continue with current treatment.

## 2025-04-29 ENCOUNTER — PHYSICAL THERAPY (OUTPATIENT)
Dept: PHYSICAL THERAPY | Facility: REHABILITATION | Age: 55
End: 2025-04-29
Attending: STUDENT IN AN ORGANIZED HEALTH CARE EDUCATION/TRAINING PROGRAM
Payer: COMMERCIAL

## 2025-04-29 DIAGNOSIS — M54.50 LOW BACK PAIN, UNSPECIFIED BACK PAIN LATERALITY, UNSPECIFIED CHRONICITY, UNSPECIFIED WHETHER SCIATICA PRESENT: ICD-10-CM

## 2025-04-29 PROCEDURE — 97014 ELECTRIC STIMULATION THERAPY: CPT

## 2025-04-29 PROCEDURE — 97110 THERAPEUTIC EXERCISES: CPT

## 2025-04-29 NOTE — OP THERAPY DAILY TREATMENT
Outpatient Physical Therapy  DAILY TREATMENT     [facility] [place]  [address] [place] [address], [address]  [facility] [address]  Phone:  [phone]  Fax:  [phone]    Date: 04/29/2025    Patient: Reba Richardson  YOB: 1970  MRN: [id number]     Time Calculation    Start time: 0430  Stop time: 0515 Time Calculation (min): 45 minutes       Chief Complaint: back problem    Visit #: 3    SUBJECTIVE:  Reports compliance with HEP and notes no pain with the exercises. Continues to have pain especially afte work and has not had any days off for 2 weeks.     OBJECTIVE:  Current objective measures:           Therapeutic Exercises (CPT 79550):     1. supine lumbar twist, x 1min    2. Supine ball roll, abd bracing 1min x 2    3. Supine march, lvl 1 x 20, with cuff for proprioception    4. Supine march, lvl 2 x 20    5. ball bridge, x 25    6. Seated pelvic tilt, a/p x 30, lateral x 40    7. open book, x 40      Therapeutic Exercise Summary: HEP instruction/performance and development. Handout provided and exercises located below:  Access Code: [email or web address]  URL: [email or web address]  Date: 04/24/2025  Prepared by: Himanshu Shay    Exercises  - Cat Cow to Child's Pose  - 2 x daily - 20 reps - 1-5 hold  - Quadruped Alternating Leg Extensions  - 1 x daily - 2 sets - 10 reps  - Supine March with Posterior Pelvic Tilt  - 1 x daily - 2 sets - 10 reps    Therapeutic Treatments and Modalities:     1. E Stim Unattended (CPT 54390), IFC and MHX to lumbar spine x 15min    Time-based treatments/modalities:    Physical Therapy Timed Treatment Charges  Therapeutic exercise minutes (CPT 60657): 30 minutes  Pain rating (1-10) before treatment:  1  Pain rating (1-10) after treatment:  1    ASSESSMENT:   Response to treatment: symptoms controlled and suspect likely related to overuse based on presentation. HEP to be maintained. F/u Thursday.     PLAN/RECOMMENDATIONS:   Plan for treatment: therapy  treatment to continue next visit.  Planned interventions for next visit: continue with current treatment.

## 2025-05-01 ENCOUNTER — PHYSICAL THERAPY (OUTPATIENT)
Dept: PHYSICAL THERAPY | Facility: REHABILITATION | Age: 55
End: 2025-05-01
Attending: STUDENT IN AN ORGANIZED HEALTH CARE EDUCATION/TRAINING PROGRAM
Payer: COMMERCIAL

## 2025-05-01 DIAGNOSIS — M54.50 LOW BACK PAIN, UNSPECIFIED BACK PAIN LATERALITY, UNSPECIFIED CHRONICITY, UNSPECIFIED WHETHER SCIATICA PRESENT: ICD-10-CM

## 2025-05-01 PROCEDURE — 97014 ELECTRIC STIMULATION THERAPY: CPT

## 2025-05-01 PROCEDURE — 97110 THERAPEUTIC EXERCISES: CPT

## 2025-05-01 NOTE — OP THERAPY DAILY TREATMENT
Outpatient Physical Therapy  DAILY TREATMENT     [facility] [place]  [address]  [facility] [address]  Phone:  [phone]  Fax:  [phone]    Date: 05/01/2025    Patient: Reba Richardson  YOB: 1970  MRN: [id number]     Time Calculation    Start time: 1545  Stop time: 1625 Time Calculation (min): 40 minutes       Chief Complaint: back problem    Visit #: 4    SUBJECTIVE:  Reports the same: Reports compliance with HEP and notes no pain with the exercises. Continues to have pain especially afte work and has not had any days off for 2 weeks.     OBJECTIVE:  Current objective measures:           Therapeutic Exercises (CPT 22046):     1. supine lumbar twist, x 1min    2. Supine ball roll, abd bracing 1min x 2    3. Supine march, lvl 1 x 20, with cuff for proprioception    4. Supine march, lvl 2 x 20    5. bolster bridge, x 20    6. Seated pelvic tilt, a/p x 30, lateral x 40    7. open book, x 40    8. prone ball back extension, 3 sec holds x 20 lvl 1    9. paloff press, double green, 5 sec x 10      Therapeutic Exercise Summary: HEP instruction/performance and development. Handout provided and exercises located below:  Access Code: [email or web address]  URL: [email or web address]  Date: 04/24/2025  Prepared by: Himanshu Shay    Exercises  - Cat Cow to Child's Pose  - 2 x daily - 20 reps - 1-5 hold  - Quadruped Alternating Leg Extensions  - 1 x daily - 2 sets - 10 reps  - Supine March with Posterior Pelvic Tilt  - 1 x daily - 2 sets - 10 reps    Therapeutic Treatments and Modalities:     1. E Stim Unattended (CPT 39525), IFC and MHX to lumbar spine x 15min    Time-based treatments/modalities:    Physical Therapy Timed Treatment Charges  Therapeutic exercise minutes (CPT 98394): 25 minutes  Pain rating (1-10) before treatment:  1  Pain rating (1-10) after treatment:  1    ASSESSMENT:   Response to treatment: symptoms controlled and suspect likely related to overuse  based on presentation. HEP to be maintained. F/u next week.    PLAN/RECOMMENDATIONS:   Plan for treatment: therapy treatment to continue next visit.  Planned interventions for next visit: continue with current treatment.

## 2025-05-06 ENCOUNTER — PHYSICAL THERAPY (OUTPATIENT)
Dept: PHYSICAL THERAPY | Facility: REHABILITATION | Age: 55
End: 2025-05-06
Attending: STUDENT IN AN ORGANIZED HEALTH CARE EDUCATION/TRAINING PROGRAM
Payer: COMMERCIAL

## 2025-05-06 DIAGNOSIS — M54.50 LOW BACK PAIN, UNSPECIFIED BACK PAIN LATERALITY, UNSPECIFIED CHRONICITY, UNSPECIFIED WHETHER SCIATICA PRESENT: ICD-10-CM

## 2025-05-06 PROCEDURE — 97110 THERAPEUTIC EXERCISES: CPT

## 2025-05-06 PROCEDURE — 97014 ELECTRIC STIMULATION THERAPY: CPT

## 2025-05-06 NOTE — OP THERAPY DAILY TREATMENT
Outpatient Physical Therapy  DAILY TREATMENT     [facility] [place]  [address]  [facility] [address]  Phone:  [phone]  Fax:  [phone]    Date: 05/06/2025    Patient: Reba Richardson  YOB: 1970  MRN: [id number]     Time Calculation    Start time: 1630  Stop time: 1715 Time Calculation (min): 45 minutes       Chief Complaint: back problem    Visit #: 5    SUBJECTIVE:  Notes no increased pain after therapy sessions. Has not done the exercises outside of therapy sessions as she has been working every day.     OBJECTIVE:  Current objective measures:           Therapeutic Exercises (CPT 33140):     1. supine lumbar twist, x 1min    2. Supine ball roll, abd bracing 1min x 2    3. Supine march, lvl 1 x 20, with cuff for proprioception    4. Supine march, lvl 2 x 20    5. bolster bridge, x 20    6. Seated pelvic tilt, a/p x 30, lateral x 40    7. open book, x12 ea    8. prone ball back extension, 3 sec holds x 20 lvl 1, lvl 2 x 15, 2 sec holds    9. paloff press, double green, NT      Therapeutic Exercise Summary: HEP instruction/performance and development. Handout provided and exercises located below:  Access Code: [email or web address]  URL: [email or web address]  Date: 04/24/2025  Prepared by: Himanshu Shay    Exercises  - Cat Cow to Child's Pose  - 2 x daily - 20 reps - 1-5 hold  - Quadruped Alternating Leg Extensions  - 1 x daily - 2 sets - 10 reps  - Supine March with Posterior Pelvic Tilt  - 1 x daily - 2 sets - 10 reps    Therapeutic Treatments and Modalities:     1. E Stim Unattended (CPT 46905), IFC and MHX to lumbar spine x 15min    Time-based treatments/modalities:    Physical Therapy Timed Treatment Charges  Therapeutic exercise minutes (CPT 33595): 30 minutes  Pain rating (1-10) before treatment:  1  Pain rating (1-10) after treatment:  1    ASSESSMENT:   Response to treatment: symptoms controlled and suspect likely related to overuse based on  presentation. HEP to be maintained. F/u next week.    PLAN/RECOMMENDATIONS:   Plan for treatment: therapy treatment to continue next visit.  Planned interventions for next visit: continue with current treatment.

## 2025-05-08 ENCOUNTER — PHYSICAL THERAPY (OUTPATIENT)
Dept: PHYSICAL THERAPY | Facility: REHABILITATION | Age: 55
End: 2025-05-08
Attending: STUDENT IN AN ORGANIZED HEALTH CARE EDUCATION/TRAINING PROGRAM
Payer: COMMERCIAL

## 2025-05-08 DIAGNOSIS — M54.50 LOW BACK PAIN, UNSPECIFIED BACK PAIN LATERALITY, UNSPECIFIED CHRONICITY, UNSPECIFIED WHETHER SCIATICA PRESENT: ICD-10-CM

## 2025-05-08 PROCEDURE — 97110 THERAPEUTIC EXERCISES: CPT

## 2025-05-08 NOTE — OP THERAPY DAILY TREATMENT
Outpatient Physical Therapy  DAILY TREATMENT     [facility] [place]  [address]  [facility] [address]  Phone:  [phone]  Fax:  [phone]    Date: 05/08/2025    Patient: Reba Richardson  YOB: 1970  MRN: [id number]     Time Calculation    Start time: 0345  Stop time: 0430 Time Calculation (min): 45 minutes       Chief Complaint: back problem    Visit #: 6    SUBJECTIVE:  No reported changes:Notes no increased pain after therapy sessions. Has not done the exercises outside of therapy sessions as she has been working every day.     OBJECTIVE:  Current objective measures:           Therapeutic Exercises (CPT 13529):     1. supine lumbar twist, x 1min    2. Supine ball roll, abd bracing 1min x 2    3. Supine march, lvl 1 x 20    4. Supine march, lvl 2 x 20, lvl 3 x 15    5. bolster bridge, x 20    6. Seated pelvic tilt, a/p x 30, lateral x 40    7. open book, x12 ea    8. prone ball back extension, 3 sec holds x 20 lvl 1, NT    9. paloff press, double green, NT      Therapeutic Exercise Summary: HEP instruction/performance and development. Handout provided and exercises located below:  Access Code: [email or web address]  URL: [email or web address]  Date: 04/24/2025  Prepared by: Himanshu Shay    Exercises  - Cat Cow to Child's Pose  - 2 x daily - 20 reps - 1-5 hold  - Quadruped Alternating Leg Extensions  - 1 x daily - 2 sets - 10 reps  - Supine March with Posterior Pelvic Tilt  - 1 x daily - 2 sets - 10 reps    Therapeutic Treatments and Modalities:     1. E Stim Unattended (CPT 17897), IFC and MHX to lumbar spine x 15min    Time-based treatments/modalities:    Physical Therapy Timed Treatment Charges  Therapeutic exercise minutes (CPT 98059): 30 minutes  Pain rating (1-10) before treatment:  1  Pain rating (1-10) after treatment:  1    ASSESSMENT:   Response to treatment: again symptoms controlled and suspect likely related to overuse based on presentation.  Exercises progressed in house minimally and with limited downtime from work hard to implement progressions with HEP. F/u next week. Trial HEP progression as Pt will have 2 days off recovery.     PLAN/RECOMMENDATIONS:   Plan for treatment: therapy treatment to continue next visit.  Planned interventions for next visit: continue with current treatment.

## 2025-05-13 ENCOUNTER — PHYSICAL THERAPY (OUTPATIENT)
Dept: PHYSICAL THERAPY | Facility: REHABILITATION | Age: 55
End: 2025-05-13
Attending: STUDENT IN AN ORGANIZED HEALTH CARE EDUCATION/TRAINING PROGRAM
Payer: COMMERCIAL

## 2025-05-13 DIAGNOSIS — M54.50 LOW BACK PAIN, UNSPECIFIED BACK PAIN LATERALITY, UNSPECIFIED CHRONICITY, UNSPECIFIED WHETHER SCIATICA PRESENT: ICD-10-CM

## 2025-05-13 PROCEDURE — 97110 THERAPEUTIC EXERCISES: CPT

## 2025-05-13 PROCEDURE — 97014 ELECTRIC STIMULATION THERAPY: CPT

## 2025-05-13 NOTE — OP THERAPY DAILY TREATMENT
Outpatient Physical Therapy  DAILY TREATMENT     [facility] [place]  [address], [address]  [facility] [address]  Phone:  [phone]  Fax:  [phone]    Date: 05/13/2025    Patient: Reba Richardson  YOB: 1970  MRN: [id number]     Time Calculation    Start time: 1630  Stop time: 1715 Time Calculation (min): 45 minutes         Chief Complaint: Back Problem    Visit #: 7    SUBJECTIVE:  Pt reports her back is sore today as she is coming from work. Pt reports she has been doing her HEP she reports they help a little. Pt reports with work her back hurts a lot more.     OBJECTIVE:  Current objective measures:           Therapeutic Exercises (CPT 44120):     1. supine lumbar twist, x 1min    2. Supine ball roll, abd bracing 1min x 2    3. Supine march, lvl 1 x 20    4. Supine march, lvl 2 x 20, lvl 3 x 15    5. bolster bridge, x 20    6. Seated pelvic tilt, a/p x 30, lateral x 40    7. open book, x12 ea    8. prone ball back extension, 3 sec holds x 20 lvl 1, NT    9. paloff press, double green, NT    10. Nustep, 5 mins, L2      Therapeutic Exercise Summary: HEP instruction/performance and development. Handout provided and exercises located below:  Access Code: [email or web address]  URL: [email or web address]  Date: 04/24/2025  Prepared by: Himanshu Shay    Exercises  - Cat Cow to Child's Pose  - 2 x daily - 20 reps - 1-5 hold  - Quadruped Alternating Leg Extensions  - 1 x daily - 2 sets - 10 reps  - Supine March with Posterior Pelvic Tilt  - 1 x daily - 2 sets - 10 reps    Therapeutic Treatments and Modalities:     1. E Stim Unattended (CPT 09199), IFC and MHX to lumbar spine x 15min    Time-based treatments/modalities:    Physical Therapy Timed Treatment Charges  Therapeutic exercise minutes (CPT 87917): 40 minutes          ASSESSMENT:   Response to treatment: Pt tolerated treatment well today. Pt required moderate VC and TC for proper abdominal bracing. Continued with POC  for abdominal and B hip strengthening. Will evaluate pt lifting mechanics and integrate work related tasks during treatment in order to decrease risk of injury at work at next visit. Pt will still benefit from skilled physical therapy in order to improve functional activity tolerance.     PLAN/RECOMMENDATIONS:   Plan for treatment: therapy treatment to continue next visit.  Planned interventions for next visit: continue with current treatment.

## 2025-05-15 ENCOUNTER — APPOINTMENT (OUTPATIENT)
Dept: PHYSICAL THERAPY | Facility: REHABILITATION | Age: 55
End: 2025-05-15
Attending: STUDENT IN AN ORGANIZED HEALTH CARE EDUCATION/TRAINING PROGRAM
Payer: COMMERCIAL

## 2025-05-20 ENCOUNTER — APPOINTMENT (OUTPATIENT)
Dept: PHYSICAL THERAPY | Facility: REHABILITATION | Age: 55
End: 2025-05-20
Attending: STUDENT IN AN ORGANIZED HEALTH CARE EDUCATION/TRAINING PROGRAM
Payer: COMMERCIAL

## 2025-05-22 ENCOUNTER — APPOINTMENT (OUTPATIENT)
Dept: PHYSICAL THERAPY | Facility: REHABILITATION | Age: 55
End: 2025-05-22
Attending: STUDENT IN AN ORGANIZED HEALTH CARE EDUCATION/TRAINING PROGRAM
Payer: COMMERCIAL

## 2025-08-09 ENCOUNTER — HOSPITAL ENCOUNTER (OUTPATIENT)
Dept: LAB | Facility: MEDICAL CENTER | Age: 55
End: 2025-08-09
Attending: STUDENT IN AN ORGANIZED HEALTH CARE EDUCATION/TRAINING PROGRAM
Payer: COMMERCIAL

## 2025-08-09 LAB
25(OH)D3 SERPL-MCNC: 30 NG/ML (ref 30–100)
ALBUMIN SERPL BCP-MCNC: 4.4 G/DL (ref 3.2–4.9)
ALBUMIN/GLOB SERPL: 1.6 G/DL
ALP SERPL-CCNC: 64 U/L (ref 30–99)
ALT SERPL-CCNC: 20 U/L (ref 2–50)
ANION GAP SERPL CALC-SCNC: 11 MMOL/L (ref 7–16)
AST SERPL-CCNC: 21 U/L (ref 12–45)
BASOPHILS # BLD AUTO: 0.5 % (ref 0–1.8)
BASOPHILS # BLD: 0.02 K/UL (ref 0–0.12)
BILIRUB SERPL-MCNC: 0.4 MG/DL (ref 0.1–1.5)
BUN SERPL-MCNC: 15 MG/DL (ref 8–22)
CALCIUM ALBUM COR SERPL-MCNC: 9.2 MG/DL (ref 8.5–10.5)
CALCIUM SERPL-MCNC: 9.5 MG/DL (ref 8.5–10.5)
CHLORIDE SERPL-SCNC: 105 MMOL/L (ref 96–112)
CHOLEST SERPL-MCNC: 196 MG/DL (ref 100–199)
CO2 SERPL-SCNC: 23 MMOL/L (ref 20–33)
CREAT SERPL-MCNC: 0.73 MG/DL (ref 0.5–1.4)
EOSINOPHIL # BLD AUTO: 0.1 K/UL (ref 0–0.51)
EOSINOPHIL NFR BLD: 2.6 % (ref 0–6.9)
ERYTHROCYTE [DISTWIDTH] IN BLOOD BY AUTOMATED COUNT: 41.9 FL (ref 35.9–50)
EST. AVERAGE GLUCOSE BLD GHB EST-MCNC: 105 MG/DL
FASTING STATUS PATIENT QL REPORTED: NORMAL
GFR SERPLBLD CREATININE-BSD FMLA CKD-EPI: 97 ML/MIN/1.73 M 2
GLOBULIN SER CALC-MCNC: 2.7 G/DL (ref 1.9–3.5)
GLUCOSE SERPL-MCNC: 94 MG/DL (ref 65–99)
HBA1C MFR BLD: 5.3 % (ref 4–5.6)
HCT VFR BLD AUTO: 42.4 % (ref 37–47)
HDLC SERPL-MCNC: 66 MG/DL
HGB BLD-MCNC: 13.7 G/DL (ref 12–16)
IMM GRANULOCYTES # BLD AUTO: 0.01 K/UL (ref 0–0.11)
IMM GRANULOCYTES NFR BLD AUTO: 0.3 % (ref 0–0.9)
LDLC SERPL CALC-MCNC: 115 MG/DL
LYMPHOCYTES # BLD AUTO: 1.38 K/UL (ref 1–4.8)
LYMPHOCYTES NFR BLD: 35.7 % (ref 22–41)
MCH RBC QN AUTO: 31.4 PG (ref 27–33)
MCHC RBC AUTO-ENTMCNC: 32.3 G/DL (ref 32.2–35.5)
MCV RBC AUTO: 97.2 FL (ref 81.4–97.8)
MONOCYTES # BLD AUTO: 0.27 K/UL (ref 0–0.85)
MONOCYTES NFR BLD AUTO: 7 % (ref 0–13.4)
NEUTROPHILS # BLD AUTO: 2.09 K/UL (ref 1.82–7.42)
NEUTROPHILS NFR BLD: 53.9 % (ref 44–72)
NRBC # BLD AUTO: 0 K/UL
NRBC BLD-RTO: 0 /100 WBC (ref 0–0.2)
PLATELET # BLD AUTO: 207 K/UL (ref 164–446)
PMV BLD AUTO: 10.7 FL (ref 9–12.9)
POTASSIUM SERPL-SCNC: 4.5 MMOL/L (ref 3.6–5.5)
PROT SERPL-MCNC: 7.1 G/DL (ref 6–8.2)
RBC # BLD AUTO: 4.36 M/UL (ref 4.2–5.4)
SODIUM SERPL-SCNC: 139 MMOL/L (ref 135–145)
TRIGL SERPL-MCNC: 73 MG/DL (ref 0–149)
TSH SERPL DL<=0.005 MIU/L-ACNC: 0.57 UIU/ML (ref 0.38–5.33)
WBC # BLD AUTO: 3.9 K/UL (ref 4.8–10.8)

## 2025-08-09 PROCEDURE — 80053 COMPREHEN METABOLIC PANEL: CPT

## 2025-08-09 PROCEDURE — 80061 LIPID PANEL: CPT

## 2025-08-09 PROCEDURE — 85025 COMPLETE CBC W/AUTO DIFF WBC: CPT

## 2025-08-09 PROCEDURE — 84443 ASSAY THYROID STIM HORMONE: CPT

## 2025-08-09 PROCEDURE — 36415 COLL VENOUS BLD VENIPUNCTURE: CPT

## 2025-08-09 PROCEDURE — 82306 VITAMIN D 25 HYDROXY: CPT

## 2025-08-09 PROCEDURE — 83036 HEMOGLOBIN GLYCOSYLATED A1C: CPT
